# Patient Record
Sex: MALE | NOT HISPANIC OR LATINO | URBAN - METROPOLITAN AREA
[De-identification: names, ages, dates, MRNs, and addresses within clinical notes are randomized per-mention and may not be internally consistent; named-entity substitution may affect disease eponyms.]

---

## 2019-04-10 ENCOUNTER — OFFICE VISIT (OUTPATIENT)
Dept: URBAN - METROPOLITAN AREA CLINIC 29 | Facility: CLINIC | Age: 79
End: 2019-04-10
Payer: MEDICARE

## 2019-04-10 PROCEDURE — 92014 COMPRE OPH EXAM EST PT 1/>: CPT | Performed by: OPTOMETRIST

## 2019-04-10 ASSESSMENT — INTRAOCULAR PRESSURE
OD: 12
OS: 10

## 2019-04-10 NOTE — IMPRESSION/PLAN
Impression: Type 2 diabetes mellitus w/o complication: X99.0. No Diabetic related eye Disease OU Plan: Discussed diagnosis in detail with patient. Emphasized blood sugar control. Will continue to observe condition and or symptoms. Call if symptoms occur.

## 2021-02-10 ENCOUNTER — OFFICE VISIT (OUTPATIENT)
Dept: URBAN - METROPOLITAN AREA CLINIC 29 | Facility: CLINIC | Age: 81
End: 2021-02-10
Payer: MEDICARE

## 2021-02-10 DIAGNOSIS — H04.123 DRY EYE SYNDROME OF BILATERAL LACRIMAL GLANDS: ICD-10-CM

## 2021-02-10 DIAGNOSIS — Z79.84 LONG TERM CURRENT USE OF ORAL HYPOGLYCEMIC DRUGS: ICD-10-CM

## 2021-02-10 DIAGNOSIS — E11.9 TYPE 2 DIABETES MELLITUS W/O COMPLICATION: Primary | ICD-10-CM

## 2021-02-10 PROCEDURE — 92014 COMPRE OPH EXAM EST PT 1/>: CPT | Performed by: OPTOMETRIST

## 2021-02-10 PROCEDURE — 92134 CPTRZ OPH DX IMG PST SGM RTA: CPT | Performed by: OPTOMETRIST

## 2021-02-10 ASSESSMENT — INTRAOCULAR PRESSURE
OD: 12
OS: 12

## 2021-02-10 NOTE — IMPRESSION/PLAN
Impression: Type 2 diabetes mellitus w/o complication: D61.0. No Diabetic related eye Disease OU Plan: Discussed diagnosis in detail with patient. Emphasized blood sugar control. Will continue to observe condition and or symptoms. Call if symptoms occur.

## 2021-02-10 NOTE — ASSESSMENT/PLAN
Impression: OCT MAC - OD: Good-; OS: Good- Plan: Normal macula appearance without signs of macular edema OU.

## 2021-02-10 NOTE — IMPRESSION/PLAN
Impression: Dry eye syndrome of bilateral lacrimal glands: H04.123. Plan: Discussed diagnosis in detail with patient. Discussed treatment options with patient. Patient instructed to use artificial tears as instructed. Use ointment at night as instructed. Will continue to observe condition and or symptoms. Patient instructed to call if condition gets worse.

## 2022-07-13 ENCOUNTER — OFFICE VISIT (OUTPATIENT)
Dept: URBAN - METROPOLITAN AREA CLINIC 28 | Facility: CLINIC | Age: 82
End: 2022-07-13
Payer: MEDICARE

## 2022-07-13 DIAGNOSIS — H04.123 DRY EYE SYNDROME OF BILATERAL LACRIMAL GLANDS: ICD-10-CM

## 2022-07-13 DIAGNOSIS — H52.03 HYPERMETROPIA, BILATERAL: ICD-10-CM

## 2022-07-13 DIAGNOSIS — E11.9 TYPE 2 DIABETES MELLITUS WITHOUT COMPLICATIONS: Primary | ICD-10-CM

## 2022-07-13 DIAGNOSIS — Z79.84 LONG TERM CURRENT USE OF ORAL HYPOGLYCEMIC DRUGS: ICD-10-CM

## 2022-07-13 PROCEDURE — 92014 COMPRE OPH EXAM EST PT 1/>: CPT | Performed by: OPTOMETRIST

## 2022-07-13 ASSESSMENT — INTRAOCULAR PRESSURE
OS: 12
OD: 12

## 2022-07-13 ASSESSMENT — VISUAL ACUITY
OD: 20/25
OS: 20/25

## 2022-07-13 NOTE — IMPRESSION/PLAN
Impression: Type 2 diabetes mellitus w/o complication: O15.1. No Diabetic related eye Disease OU Plan: Discussed diagnosis in detail with patient. Emphasized blood sugar control. Will continue to observe condition and or symptoms. Call if symptoms occur.